# Patient Record
Sex: MALE | Race: WHITE | NOT HISPANIC OR LATINO | Employment: PART TIME | URBAN - METROPOLITAN AREA
[De-identification: names, ages, dates, MRNs, and addresses within clinical notes are randomized per-mention and may not be internally consistent; named-entity substitution may affect disease eponyms.]

---

## 2023-09-09 PROBLEM — E55.9 VITAMIN D DEFICIENCY: Status: ACTIVE | Noted: 2023-09-09

## 2023-09-09 PROBLEM — H02.831 DERMATOCHALASIS OF RIGHT UPPER EYELID: Status: ACTIVE | Noted: 2023-09-09

## 2023-09-09 PROBLEM — J30.2 SEASONAL ALLERGIES: Status: ACTIVE | Noted: 2023-09-09

## 2023-09-09 PROBLEM — F33.8 SEASONAL AFFECTIVE DISORDER (CMS-HCC): Status: ACTIVE | Noted: 2023-09-09

## 2023-09-09 PROBLEM — E66.01 MORBID OBESITY (MULTI): Status: ACTIVE | Noted: 2023-09-09

## 2023-09-09 PROBLEM — K21.00 GASTROESOPHAGEAL REFLUX DISEASE WITH ESOPHAGITIS WITHOUT HEMORRHAGE: Status: ACTIVE | Noted: 2023-09-09

## 2023-09-09 PROBLEM — E78.5 HYPERLIPIDEMIA: Status: ACTIVE | Noted: 2023-09-09

## 2023-09-09 PROBLEM — E11.9 ABNORMAL METABOLIC STATE DUE TO DIABETES MELLITUS (MULTI): Status: ACTIVE | Noted: 2023-09-09

## 2023-09-09 PROBLEM — H35.40 PERIPHERAL RETINAL DEGENERATION: Status: ACTIVE | Noted: 2023-09-09

## 2023-09-09 PROBLEM — H25.10 NUCLEAR SENILE CATARACT: Status: ACTIVE | Noted: 2023-09-09

## 2023-09-09 PROBLEM — I10 ESSENTIAL (PRIMARY) HYPERTENSION: Status: ACTIVE | Noted: 2023-09-09

## 2023-09-09 PROBLEM — H52.7 UNSPECIFIED DISORDER OF REFRACTION: Status: ACTIVE | Noted: 2023-09-09

## 2023-09-09 PROBLEM — G47.33 OBSTRUCTIVE SLEEP APNEA SYNDROME: Status: ACTIVE | Noted: 2023-09-09

## 2023-09-09 PROBLEM — H02.834 DERMATOCHALASIS OF LEFT UPPER EYELID: Status: ACTIVE | Noted: 2023-09-09

## 2023-09-09 PROBLEM — E11.65 TYPE 2 DIABETES MELLITUS WITH HYPERGLYCEMIA (MULTI): Status: ACTIVE | Noted: 2023-09-09

## 2023-09-09 PROBLEM — H35.419 RETINAL LATTICE DEGENERATION: Status: ACTIVE | Noted: 2023-09-09

## 2023-09-09 RX ORDER — INSULIN HUMAN 100 [IU]/ML
INJECTION, SUSPENSION SUBCUTANEOUS
COMMUNITY

## 2023-09-09 RX ORDER — OMEPRAZOLE 40 MG/1
CAPSULE, DELAYED RELEASE ORAL
COMMUNITY
End: 2023-10-10

## 2023-09-09 RX ORDER — OXYCODONE AND ACETAMINOPHEN 5; 325 MG/1; MG/1
TABLET ORAL
COMMUNITY

## 2023-09-09 RX ORDER — ASPIRIN 325 MG
TABLET, DELAYED RELEASE (ENTERIC COATED) ORAL
COMMUNITY

## 2023-09-09 RX ORDER — AZITHROMYCIN 250 MG/1
TABLET, FILM COATED ORAL
COMMUNITY
Start: 2022-12-08

## 2023-09-09 RX ORDER — ATENOLOL 50 MG/1
TABLET ORAL
COMMUNITY

## 2023-09-09 RX ORDER — ISOSORBIDE MONONITRATE 30 MG/1
TABLET, EXTENDED RELEASE ORAL
COMMUNITY

## 2023-09-09 RX ORDER — FLUTICASONE PROPIONATE 50 MCG
SPRAY, SUSPENSION (ML) NASAL
COMMUNITY

## 2023-09-09 RX ORDER — DULAGLUTIDE 1.5 MG/.5ML
INJECTION, SOLUTION SUBCUTANEOUS
COMMUNITY

## 2023-09-09 RX ORDER — ATORVASTATIN CALCIUM 40 MG/1
TABLET, FILM COATED ORAL
COMMUNITY
End: 2023-10-16

## 2023-09-09 RX ORDER — DULAGLUTIDE 0.75 MG/.5ML
INJECTION, SOLUTION SUBCUTANEOUS
COMMUNITY

## 2023-09-09 RX ORDER — LORATADINE 10 MG/1
TABLET ORAL
COMMUNITY

## 2023-09-09 RX ORDER — LISINOPRIL 10 MG/1
TABLET ORAL
COMMUNITY

## 2023-09-11 ENCOUNTER — HOSPITAL ENCOUNTER (OUTPATIENT)
Dept: DATA CONVERSION | Facility: HOSPITAL | Age: 63
Discharge: HOME | End: 2023-09-11
Payer: MEDICARE

## 2023-09-11 DIAGNOSIS — N20.0 CALCULUS OF KIDNEY: ICD-10-CM

## 2023-10-14 DIAGNOSIS — E78.5 HYPERLIPIDEMIA, UNSPECIFIED HYPERLIPIDEMIA TYPE: ICD-10-CM

## 2023-10-16 RX ORDER — ATORVASTATIN CALCIUM 40 MG/1
40 TABLET, FILM COATED ORAL DAILY
Qty: 90 TABLET | Refills: 1 | Status: SHIPPED | OUTPATIENT
Start: 2023-10-16 | End: 2024-03-27

## 2023-12-29 DIAGNOSIS — K21.00 GASTROESOPHAGEAL REFLUX DISEASE WITH ESOPHAGITIS WITHOUT HEMORRHAGE: ICD-10-CM

## 2023-12-29 RX ORDER — OMEPRAZOLE 40 MG/1
CAPSULE, DELAYED RELEASE ORAL
Qty: 42 CAPSULE | Refills: 1 | OUTPATIENT
Start: 2023-12-29

## 2024-03-25 ENCOUNTER — OFFICE VISIT (OUTPATIENT)
Dept: OPHTHALMOLOGY | Facility: CLINIC | Age: 64
End: 2024-03-25
Payer: MEDICARE

## 2024-03-25 ENCOUNTER — CLINICAL SUPPORT (OUTPATIENT)
Dept: OPHTHALMOLOGY | Facility: CLINIC | Age: 64
End: 2024-03-25
Payer: MEDICARE

## 2024-03-25 ENCOUNTER — APPOINTMENT (OUTPATIENT)
Dept: OPHTHALMOLOGY | Facility: CLINIC | Age: 64
End: 2024-03-25
Payer: MEDICARE

## 2024-03-25 DIAGNOSIS — H35.40 PERIPHERAL RETINAL DEGENERATION: ICD-10-CM

## 2024-03-25 DIAGNOSIS — E11.65 TYPE 2 DIABETES MELLITUS WITH HYPERGLYCEMIA, UNSPECIFIED WHETHER LONG TERM INSULIN USE (MULTI): Primary | ICD-10-CM

## 2024-03-25 DIAGNOSIS — H52.7 UNSPECIFIED DISORDER OF REFRACTION: ICD-10-CM

## 2024-03-25 DIAGNOSIS — H02.834 DERMATOCHALASIS OF BOTH UPPER EYELIDS: ICD-10-CM

## 2024-03-25 DIAGNOSIS — H25.813 COMBINED FORMS OF AGE-RELATED CATARACT OF BOTH EYES: ICD-10-CM

## 2024-03-25 DIAGNOSIS — H02.831 DERMATOCHALASIS OF BOTH UPPER EYELIDS: ICD-10-CM

## 2024-03-25 PROCEDURE — 92015 DETERMINE REFRACTIVE STATE: CPT | Performed by: OPHTHALMOLOGY

## 2024-03-25 PROCEDURE — 99214 OFFICE O/P EST MOD 30 MIN: CPT | Performed by: OPHTHALMOLOGY

## 2024-03-25 RX ORDER — BLOOD SUGAR DIAGNOSTIC
STRIP MISCELLANEOUS
COMMUNITY
Start: 2023-09-19

## 2024-03-25 RX ORDER — INSULIN HUMAN 100 [IU]/ML
INJECTION, SOLUTION PARENTERAL
COMMUNITY
Start: 2023-05-18

## 2024-03-25 RX ORDER — TAMSULOSIN HYDROCHLORIDE 0.4 MG/1
0.4 CAPSULE ORAL NIGHTLY
COMMUNITY

## 2024-03-25 RX ORDER — BLOOD-GLUCOSE METER
EACH MISCELLANEOUS
COMMUNITY
Start: 2023-09-19

## 2024-03-25 RX ORDER — DIAZEPAM 10 MG/1
TABLET ORAL
COMMUNITY
Start: 2024-02-02

## 2024-03-25 RX ORDER — SYRING-NEEDL,DISP,INSUL,0.3 ML 31GX15/64"
SYRINGE, EMPTY DISPOSABLE MISCELLANEOUS
COMMUNITY
Start: 2023-05-07

## 2024-03-25 RX ORDER — ERGOCALCIFEROL 1.25 MG/1
CAPSULE ORAL
COMMUNITY

## 2024-03-25 ASSESSMENT — REFRACTION_MANIFEST
METHOD_AUTOREFRACTION: 1
OD_AXIS: 095
OS_CYLINDER: -1.50
OS_SPHERE: +0.25
OS_AXIS: 080
OD_CYLINDER: -0.75
OD_SPHERE: -0.25

## 2024-03-25 ASSESSMENT — ENCOUNTER SYMPTOMS
EYES NEGATIVE: 0
PSYCHIATRIC NEGATIVE: 0
ENDOCRINE NEGATIVE: 0
HEMATOLOGIC/LYMPHATIC NEGATIVE: 0
RESPIRATORY NEGATIVE: 0
OCCASIONAL FEELINGS OF UNSTEADINESS: 0
CONSTITUTIONAL NEGATIVE: 0
GASTROINTESTINAL NEGATIVE: 0
ALLERGIC/IMMUNOLOGIC NEGATIVE: 0
CARDIOVASCULAR NEGATIVE: 0
LOSS OF SENSATION IN FEET: 0
NEUROLOGICAL NEGATIVE: 0
DEPRESSION: 0
MUSCULOSKELETAL NEGATIVE: 0

## 2024-03-25 ASSESSMENT — KERATOMETRY
OS_AXISANGLE2_DEGREES: 180
OS_K1POWER_DIOPTERS: 43.00
METHOD_AUTO_MANUAL: AUTOMATED
OS_AXISANGLE_DEGREES: 90
OD_K1POWER_DIOPTERS: 42.25
OD_AXISANGLE_DEGREES: 110
OD_AXISANGLE2_DEGREES: 20
OS_K2POWER_DIOPTERS: 42.25
OD_K2POWER_DIOPTERS: 42.50

## 2024-03-25 ASSESSMENT — CUP TO DISC RATIO
OS_RATIO: 0.4
OD_RATIO: 0.4

## 2024-03-25 ASSESSMENT — SLIT LAMP EXAM - LIDS
COMMENTS: 1+ BLEPHARITIS, 1+ DERMATOCHALASIS - UPPER LID
COMMENTS: 1+ BLEPHARITIS, 1+ DERMATOCHALASIS - UPPER LID

## 2024-03-25 ASSESSMENT — VISUAL ACUITY
OD_SC: 20/30
METHOD: SNELLEN - SINGLE
OS_SC: 20/30
OS_SC+: +1
OD_SC+: +1

## 2024-03-25 ASSESSMENT — PAIN SCALES - GENERAL: PAINLEVEL: 0-NO PAIN

## 2024-03-25 ASSESSMENT — PATIENT HEALTH QUESTIONNAIRE - PHQ9
1. LITTLE INTEREST OR PLEASURE IN DOING THINGS: NOT AT ALL
SUM OF ALL RESPONSES TO PHQ9 QUESTIONS 1 AND 2: 0
2. FEELING DOWN, DEPRESSED OR HOPELESS: NOT AT ALL

## 2024-03-25 ASSESSMENT — TONOMETRY
OD_IOP_MMHG: 16
OS_IOP_MMHG: 16
IOP_METHOD: GOLDMANN APPLANATION

## 2024-03-25 ASSESSMENT — EXTERNAL EXAM - RIGHT EYE: OD_EXAM: BROW PTOSIS

## 2024-03-25 ASSESSMENT — EXTERNAL EXAM - LEFT EYE: OS_EXAM: BROW PTOSIS

## 2024-03-25 NOTE — PROGRESS NOTES
"Subjective   Patient ID: Wyatt Castaneda is a 63 y.o. male.    Chief Complaint    Annual Exam       HPI    No visual acuity (VA) complaints    Last edited by JENI Donnelly on 3/25/2024  3:05 PM.        No current outpatient medications on file. (Ophthalmology pharm classes)       Current Outpatient Medications (Other)   Medication Sig Dispense Refill    Accu-Chek Guide Me Glucose Mtr misc USE AS DIRECTED E11.65      Accu-Chek Guide test strips strip Use as directed 3 times daily E11.65      aspirin 81 mg capsule 1 tablet Orally Once a day      atenolol (Tenormin) 50 mg tablet       atorvastatin (Lipitor) 40 mg tablet TAKE 1 TABLET BY MOUTH EVERY DAY 90 tablet 1    BD Veo Insulin Syringe UF 1/2 mL 31 gauge x 15/64\" syringe USE 3 TIMES A DAY AS DIRECTED      cholecalciferol (Vitamin D-3) 1,250 mcg (50,000 unit) capsule       fluticasone (Flonase) 50 mcg/actuation nasal spray SPRAY 1 SPRAY INTO EACH NOSTRIL EVERY DAY*INS COVERS 30 DAYS* for 30      HumuLIN R Regular U-100 Insuln 100 unit/mL injection INJECT 30 BEFORE MEALS PLUS SLIDING SCALE MAX DAILY 120 UNITS      insulin NPH, Isophane, (HumuLIN N NPH U-100 Insulin) 100 unit/mL injection 20 units breakfast 20 units lunch 25 units dinner Subcutaneous three times per day for 30 day(s)      isosorbide mononitrate ER (Imdur) 30 mg 24 hr tablet 1 tablet in the morning Orally Once a day for 30 day(s)      lisinopril 10 mg tablet 1 tablet Orally Once a day for 90 days      omeprazole (PriLOSEC) 40 mg DR capsule 1 CAPSULE 30 MINUTES BEFORE MORNING MEAL ORALLY DAILY 42 DAYS 42 capsule 1    tamsulosin (Flomax) 0.4 mg 24 hr capsule Take 1 capsule (0.4 mg) by mouth once daily at bedtime.      azithromycin (Zithromax) 250 mg tablet take two tabs today one for the next 4 days Orally Daily for 5 days      diazePAM (Valium) 10 mg tablet TAKE 1 TABLET BY MOUTH ONE TIME ONLY FOR 1 DOSE. TAKE 1 HOUR PRIOR TO MRI      dulaglutide (Trulicity) 0.75 mg/0.5 mL pen injector as directed " Subcutaneous once weekly for 90 days      dulaglutide (Trulicity) 1.5 mg/0.5 mL pen injector injection       ergocalciferol (Vitamin D-2) 1.25 MG (91057 UT) capsule TAKE 1 CAP WEEKLY 30 Oral for 30      loratadine (Claritin) 10 mg tablet       oxyCODONE-acetaminophen (Percocet) 5-325 mg tablet          Objective   Base Eye Exam       Visual Acuity (Snellen - Single)         Right Left    Dist sc 20/30 +1 20/30 +1              Tonometry (Goldmann Applanation, 3:19 PM)         Right Left    Pressure 16 16              Pupils         Dark Shape React APD    Right 4 Round 2 None    Left 4 Round 2 None              Extraocular Movement         Right Left     Full Full              Dilation       Both eyes: 1% Tropic 2.5% Phen @ 3:19 PM                  Additional Tests       Keratometry (Automated)         K1 Axis K2 Axis    Right 42.25 20 42.50 110    Left 43.00 180 42.25 90                  Slit Lamp and Fundus Exam       External Exam         Right Left    External Brow ptosis Brow ptosis              Slit Lamp Exam         Right Left    Lids/Lashes 1+ Blepharitis, 1+ Dermatochalasis - upper lid 1+ Blepharitis, 1+ Dermatochalasis - upper lid    Conjunctiva/Sclera normal bulbar and palepbral conjunctiva normal bulbar and palepbral conjunctiva    Cornea normal epi/stroma/endo and tear film normal epi/stroma/endo and tear film    Anterior Chamber normal anterior chamber, deep and quiet normal anterior chamber, deep and quiet    Iris pupil miotic; flomax pupil miotic; flomax    Lens 1+ Nuclear sclerosis, 1+ Cortical cataract 1+ Nuclear sclerosis, 1+ Cortical cataract    Anterior Vitreous vitreous syneresis vitreous syneresis              Fundus Exam         Right Left    Disc normal optic nerve normal optic nerve; tortuous vessels    C/D Ratio 0.4 0.4    Macula No background diabetic retinopathy No background diabetic retinopathy    Vessels normal vessels normal vessels    Periphery peripheral retinal degeneration  peripheral retinal degeneration                  Refraction       Manifest Refraction (Auto)         Sphere Cylinder Axis    Right -0.25 -0.75 095    Left +0.25 -1.50 080              Final Rx         Sphere Cylinder [place] Dist VA Add Near VA    Right -0.25 -0.75 090 20/20 +2.75 20/20    Left -0.00 -1.00 080 20/20 +2.75 20/20      Type: progressive    Expiration Date: 3/25/2026    Pupillary Distance: 66                    Assessment/Plan   Problem List Items Addressed This Visit          Eye/Vision problems    Dermatochalasis of both upper eyelids    Combined forms of age-related cataract of both eyes    Peripheral retinal degeneration    Type 2 diabetes mellitus with hyperglycemia (CMS/HCC) - Primary     F/u one year full.         Unspecified disorder of refraction

## 2024-03-25 NOTE — LETTER
March 25, 2024    Frances Smith DO  [address] [facility] [address]     Patient: Wyatt Castaneda   YOB: 1960   Date of Visit: 3/25/2024       Dear Dr. Frances Smith DO:    Thank you for referring Wyatt Castaneda to me for evaluation. Here is my assessment and plan of care:    Assessment/Plan:  Wyatt was seen today for annual exam.  Diagnoses and all orders for this visit:  Type 2 diabetes mellitus with hyperglycemia, unspecified whether long term insulin use (CMS/[facility]) (Primary)  Combined forms of age-related cataract of both eyes  Peripheral retinal degeneration  Dermatochalasis of both upper eyelids  Unspecified disorder of refraction    Below you will find my full exam findings. If you have questions, please do not hesitate to call me. I look forward to following Wyatt along with you.     No signs of background diabetic retinopathy (BDR) OU.  Follow up in one year.      Sincerely,        Arvind Perry MD        CC:   No Recipients        Base Eye Exam       Visual Acuity (Snellen - Single)         Right Left    Dist sc 20/30 +1 20/30 +1              Tonometry (Goldmann Applanation, 3:19 PM)         Right Left    Pressure 16 16              Pupils         Dark Shape React APD    Right 4 Round 2 None    Left 4 Round 2 None              Extraocular Movement         Right Left     Full Full              Dilation       Both eyes: 1% Tropic 2.5% Phen @ 3:19 PM                  Additional Tests       Keratometry (Automated)         K1 Axis K2 Axis    Right 42.25 20 42.50 110    Left 43.00 180 42.25 90                  Slit Lamp and Fundus Exam       External Exam         Right Left    External Brow ptosis Brow ptosis              Slit Lamp Exam         Right Left    Lids/Lashes 1+ Blepharitis, 1+ Dermatochalasis - upper lid 1+ Blepharitis, 1+ Dermatochalasis - upper lid    Conjunctiva/Sclera normal bulbar and palepbral conjunctiva normal bulbar and palepbral conjunctiva    Cornea normal  epi/stroma/endo and tear film normal epi/stroma/endo and tear film    Anterior Chamber normal anterior chamber, deep and quiet normal anterior chamber, deep and quiet    Iris pupil miotic; flomax pupil miotic; flomax    Lens 1+ Nuclear sclerosis, 1+ Cortical cataract 1+ Nuclear sclerosis, 1+ Cortical cataract    Anterior Vitreous vitreous syneresis vitreous syneresis              Fundus Exam         Right Left    Disc normal optic nerve normal optic nerve; tortuous vessels    C/D Ratio 0.4 0.4    Macula No background diabetic retinopathy No background diabetic retinopathy    Vessels normal vessels normal vessels    Periphery peripheral retinal degeneration peripheral retinal degeneration                  Refraction       Manifest Refraction (Auto)         Sphere Cylinder Axis    Right -0.25 -0.75 095    Left +0.25 -1.50 080              Final Rx         Sphere Cylinder [place] Dist VA Add Near VA    Right -0.25 -0.75 090 20/20 +2.75 20/20    Left -0.00 -1.00 080 20/20 +2.75 20/20      Type: progressive    Expiration Date: 3/25/2026    Pupillary Distance: 66

## 2024-03-27 DIAGNOSIS — E78.5 HYPERLIPIDEMIA, UNSPECIFIED HYPERLIPIDEMIA TYPE: ICD-10-CM

## 2024-03-27 RX ORDER — ATORVASTATIN CALCIUM 40 MG/1
40 TABLET, FILM COATED ORAL DAILY
Qty: 90 TABLET | Refills: 1 | Status: SHIPPED | OUTPATIENT
Start: 2024-03-27

## 2024-11-02 ENCOUNTER — HOSPITAL ENCOUNTER (EMERGENCY)
Facility: HOSPITAL | Age: 64
Discharge: HOME | End: 2024-11-02
Attending: EMERGENCY MEDICINE
Payer: MEDICARE

## 2024-11-02 VITALS
DIASTOLIC BLOOD PRESSURE: 94 MMHG | TEMPERATURE: 97.7 F | RESPIRATION RATE: 17 BRPM | WEIGHT: 230 LBS | HEART RATE: 54 BPM | BODY MASS INDEX: 33 KG/M2 | OXYGEN SATURATION: 98 % | SYSTOLIC BLOOD PRESSURE: 162 MMHG

## 2024-11-02 DIAGNOSIS — N17.9 ACUTE KIDNEY INJURY (CMS-HCC): ICD-10-CM

## 2024-11-02 DIAGNOSIS — R73.9 HYPERGLYCEMIA: Primary | ICD-10-CM

## 2024-11-02 LAB
ALBUMIN SERPL BCP-MCNC: 4 G/DL (ref 3.4–5)
ALP SERPL-CCNC: 78 U/L (ref 33–136)
ALT SERPL W P-5'-P-CCNC: 20 U/L (ref 10–52)
ANION GAP BLDV CALCULATED.4IONS-SCNC: 12 MMOL/L (ref 10–25)
ANION GAP SERPL CALC-SCNC: 10 MMOL/L (ref 10–20)
ANION GAP SERPL CALC-SCNC: 12 MMOL/L (ref 10–20)
APPEARANCE UR: CLEAR
AST SERPL W P-5'-P-CCNC: 25 U/L (ref 9–39)
BASE EXCESS BLDV CALC-SCNC: -0.8 MMOL/L (ref -2–3)
BASOPHILS # BLD AUTO: 0.05 X10*3/UL (ref 0–0.1)
BASOPHILS NFR BLD AUTO: 0.9 %
BILIRUB SERPL-MCNC: 0.4 MG/DL (ref 0–1.2)
BILIRUB UR STRIP.AUTO-MCNC: NEGATIVE MG/DL
BODY TEMPERATURE: 37 DEGREES CELSIUS
BUN SERPL-MCNC: 36 MG/DL (ref 6–23)
BUN SERPL-MCNC: 39 MG/DL (ref 6–23)
CA-I BLDV-SCNC: 1.19 MMOL/L (ref 1.1–1.33)
CALCIUM SERPL-MCNC: 8.4 MG/DL (ref 8.6–10.3)
CALCIUM SERPL-MCNC: 9 MG/DL (ref 8.6–10.3)
CHLORIDE BLDV-SCNC: 100 MMOL/L (ref 98–107)
CHLORIDE SERPL-SCNC: 101 MMOL/L (ref 98–107)
CHLORIDE SERPL-SCNC: 106 MMOL/L (ref 98–107)
CO2 SERPL-SCNC: 25 MMOL/L (ref 21–32)
CO2 SERPL-SCNC: 26 MMOL/L (ref 21–32)
COLOR UR: COLORLESS
CREAT SERPL-MCNC: 1.25 MG/DL (ref 0.5–1.3)
CREAT SERPL-MCNC: 1.54 MG/DL (ref 0.5–1.3)
EGFRCR SERPLBLD CKD-EPI 2021: 49 ML/MIN/1.73M*2
EGFRCR SERPLBLD CKD-EPI 2021: 64 ML/MIN/1.73M*2
EOSINOPHIL # BLD AUTO: 0.09 X10*3/UL (ref 0–0.7)
EOSINOPHIL NFR BLD AUTO: 1.6 %
ERYTHROCYTE [DISTWIDTH] IN BLOOD BY AUTOMATED COUNT: 14.1 % (ref 11.5–14.5)
GLUCOSE BLD MANUAL STRIP-MCNC: 281 MG/DL (ref 74–99)
GLUCOSE BLD MANUAL STRIP-MCNC: 431 MG/DL (ref 74–99)
GLUCOSE BLDV-MCNC: 395 MG/DL (ref 74–99)
GLUCOSE SERPL-MCNC: 274 MG/DL (ref 74–99)
GLUCOSE SERPL-MCNC: 395 MG/DL (ref 74–99)
GLUCOSE UR STRIP.AUTO-MCNC: ABNORMAL MG/DL
HCO3 BLDV-SCNC: 24.3 MMOL/L (ref 22–26)
HCT VFR BLD AUTO: 44.2 % (ref 41–52)
HCT VFR BLD EST: 41 % (ref 41–52)
HGB BLD-MCNC: 14.2 G/DL (ref 13.5–17.5)
HGB BLDV-MCNC: 13.6 G/DL (ref 13.5–17.5)
IMM GRANULOCYTES # BLD AUTO: 0.01 X10*3/UL (ref 0–0.7)
IMM GRANULOCYTES NFR BLD AUTO: 0.2 % (ref 0–0.9)
INHALED O2 CONCENTRATION: 21 %
KETONES UR STRIP.AUTO-MCNC: NEGATIVE MG/DL
LACTATE BLDV-SCNC: 1.5 MMOL/L (ref 0.4–2)
LEUKOCYTE ESTERASE UR QL STRIP.AUTO: NEGATIVE
LYMPHOCYTES # BLD AUTO: 1.71 X10*3/UL (ref 1.2–4.8)
LYMPHOCYTES NFR BLD AUTO: 29.7 %
MAGNESIUM SERPL-MCNC: 1.64 MG/DL (ref 1.6–2.4)
MCH RBC QN AUTO: 26.3 PG (ref 26–34)
MCHC RBC AUTO-ENTMCNC: 32.1 G/DL (ref 32–36)
MCV RBC AUTO: 82 FL (ref 80–100)
MONOCYTES # BLD AUTO: 0.44 X10*3/UL (ref 0.1–1)
MONOCYTES NFR BLD AUTO: 7.7 %
NEUTROPHILS # BLD AUTO: 3.45 X10*3/UL (ref 1.2–7.7)
NEUTROPHILS NFR BLD AUTO: 59.9 %
NITRITE UR QL STRIP.AUTO: NEGATIVE
NRBC BLD-RTO: 0 /100 WBCS (ref 0–0)
OXYHGB MFR BLDV: 81.4 % (ref 45–75)
PCO2 BLDV: 41 MM HG (ref 41–51)
PH BLDV: 7.38 PH (ref 7.33–7.43)
PH UR STRIP.AUTO: 5.5 [PH]
PHOSPHATE SERPL-MCNC: 4.3 MG/DL (ref 2.5–4.9)
PLATELET # BLD AUTO: 150 X10*3/UL (ref 150–450)
PO2 BLDV: 50 MM HG (ref 35–45)
POTASSIUM BLDV-SCNC: 4.2 MMOL/L (ref 3.5–5.3)
POTASSIUM SERPL-SCNC: 4 MMOL/L (ref 3.5–5.3)
POTASSIUM SERPL-SCNC: 4.9 MMOL/L (ref 3.5–5.3)
PROT SERPL-MCNC: 7 G/DL (ref 6.4–8.2)
PROT UR STRIP.AUTO-MCNC: ABNORMAL MG/DL
RBC # BLD AUTO: 5.39 X10*6/UL (ref 4.5–5.9)
RBC # UR STRIP.AUTO: NEGATIVE /UL
RBC #/AREA URNS AUTO: NORMAL /HPF
SAO2 % BLDV: 83 % (ref 45–75)
SODIUM BLDV-SCNC: 132 MMOL/L (ref 136–145)
SODIUM SERPL-SCNC: 134 MMOL/L (ref 136–145)
SODIUM SERPL-SCNC: 137 MMOL/L (ref 136–145)
SP GR UR STRIP.AUTO: 1.02
UROBILINOGEN UR STRIP.AUTO-MCNC: NORMAL MG/DL
WBC # BLD AUTO: 5.8 X10*3/UL (ref 4.4–11.3)
WBC #/AREA URNS AUTO: NORMAL /HPF

## 2024-11-02 PROCEDURE — 82947 ASSAY GLUCOSE BLOOD QUANT: CPT

## 2024-11-02 PROCEDURE — 2500000004 HC RX 250 GENERAL PHARMACY W/ HCPCS (ALT 636 FOR OP/ED): Performed by: EMERGENCY MEDICINE

## 2024-11-02 PROCEDURE — 36415 COLL VENOUS BLD VENIPUNCTURE: CPT | Performed by: EMERGENCY MEDICINE

## 2024-11-02 PROCEDURE — 81003 URINALYSIS AUTO W/O SCOPE: CPT | Performed by: EMERGENCY MEDICINE

## 2024-11-02 PROCEDURE — 2500000002 HC RX 250 W HCPCS SELF ADMINISTERED DRUGS (ALT 637 FOR MEDICARE OP, ALT 636 FOR OP/ED): Performed by: EMERGENCY MEDICINE

## 2024-11-02 PROCEDURE — 96360 HYDRATION IV INFUSION INIT: CPT

## 2024-11-02 PROCEDURE — 83735 ASSAY OF MAGNESIUM: CPT | Performed by: EMERGENCY MEDICINE

## 2024-11-02 PROCEDURE — 85025 COMPLETE CBC W/AUTO DIFF WBC: CPT | Performed by: EMERGENCY MEDICINE

## 2024-11-02 PROCEDURE — 84132 ASSAY OF SERUM POTASSIUM: CPT | Performed by: EMERGENCY MEDICINE

## 2024-11-02 PROCEDURE — 84132 ASSAY OF SERUM POTASSIUM: CPT | Mod: 59 | Performed by: EMERGENCY MEDICINE

## 2024-11-02 PROCEDURE — 99283 EMERGENCY DEPT VISIT LOW MDM: CPT

## 2024-11-02 PROCEDURE — 84100 ASSAY OF PHOSPHORUS: CPT | Performed by: EMERGENCY MEDICINE

## 2024-11-02 RX ORDER — INSULIN LISPRO 100 [IU]/ML
5 INJECTION, SOLUTION INTRAVENOUS; SUBCUTANEOUS ONCE
Status: COMPLETED | OUTPATIENT
Start: 2024-11-02 | End: 2024-11-02

## 2024-11-02 RX ADMIN — SODIUM CHLORIDE 1000 ML: 9 INJECTION, SOLUTION INTRAVENOUS at 17:11

## 2024-11-02 RX ADMIN — INSULIN LISPRO 5 UNITS: 100 INJECTION, SOLUTION INTRAVENOUS; SUBCUTANEOUS at 17:09

## 2024-11-02 ASSESSMENT — COLUMBIA-SUICIDE SEVERITY RATING SCALE - C-SSRS
1. IN THE PAST MONTH, HAVE YOU WISHED YOU WERE DEAD OR WISHED YOU COULD GO TO SLEEP AND NOT WAKE UP?: NO
2. HAVE YOU ACTUALLY HAD ANY THOUGHTS OF KILLING YOURSELF?: NO
6. HAVE YOU EVER DONE ANYTHING, STARTED TO DO ANYTHING, OR PREPARED TO DO ANYTHING TO END YOUR LIFE?: NO

## 2024-11-02 ASSESSMENT — PAIN SCALES - GENERAL
PAINLEVEL_OUTOF10: 0 - NO PAIN
PAINLEVEL_OUTOF10: 0 - NO PAIN

## 2024-11-02 ASSESSMENT — PAIN - FUNCTIONAL ASSESSMENT: PAIN_FUNCTIONAL_ASSESSMENT: 0-10

## 2024-11-02 NOTE — Clinical Note
Wyatt Castaneda was seen and treated in our emergency department on 11/2/2024.  He may return to work on 11/03/2024.       If you have any questions or concerns, please don't hesitate to call.      Damaso Marley MD

## 2024-11-02 NOTE — DISCHARGE INSTRUCTIONS
You were seen emergency for evaluation elevated blood sugar.  Your blood sugar came down with insulin and fluids.  Please continue your medications as prescribed, recommend going back to the original dosing as prescribed.  Please follow-up with your endocrinologist regarding this emergency room visit.  Please return if you have worsening symptoms include but not limited to significant fatigue, weakness, fever, elevated blood sugars that cannot be controlled, or if you have other concerns.

## 2024-11-02 NOTE — ED PROVIDER NOTES
History/Exam limitations: none.   Additional history was obtained from patient and past medical records.          HPI:    Wyatt Castaneda is a 64 y.o. male PMH IDDM presenting for evaluation of hyperglycemia.  Patient states that he had a continuous glucose monitor recently.  He states that it was showing that his glucose was in the low 200s and his wife advised that he cuts his 70/30 insulin in half given the low 200s readings as she was concerned this may be too low.  He states that he cut in half over last couple days and now his glucoses been climbing up and reading high today.  States that he feels a little fatigued but no focal neurodeficits.  No vision changes.  No headache, neck sickness, chest pain, shortness breath, abdominal pain, diarrhea dysuria, fever, chills.          Physical Exam:  ED Triage Vitals [11/02/24 1522]   Temperature Heart Rate Respirations BP   36.5 °C (97.7 °F) 62 20 (!) 180/95      Pulse Ox Temp Source Heart Rate Source Patient Position   96 % Temporal Monitor --      BP Location FiO2 (%)     -- --        GEN:      Alert, NAD  Eyes:       PERRL, EOMI  HENT:      NC/AT, OP clear, airway patent, MM, no nuchal rigidity  CV:      RRR, no MRG, no LE pitting edema, 2+ radial pulses  PULM:     CTAB, no w/r/r, easy WOB, symmetric chest rise  ABD:      Soft, NT, ND, no masses, BS +  :       No CVA TTP  NEURO:   A/Ox4, CN II-XII intact, strength 5/5 in all 4 ext, SILT  MSK:      FROM, no joint deformities or swelling, no e/o trauma  SKIN:       Warm and dry  PSYCH:    Appropriate mood and affect         MDM/ED Course:   Wyatt Castaneda is a 64 y.o. male PMH IDDM presenting for evaluation of hyperglycemia.  Vitals remarkable for hypertension as above.  Exam as documented above.  Differential includes hyperglycemia secondary to underlying infectious process, underdosing insulin.  No evidence of underlying infectious process on exam or on history.  Patient does report he feels a little fatigued and  his glucose excitingly feels similar to that currently.  Differential includes DKA.  Patient does state in the last few days he began having his insulin dose as his partner reportedly told him that to 30 was worrisome for being too low.  Discussed goal glucose levels at length with patient and patient verbalized understanding to all discussed.  Labs obtained and VBG with pH 7.38, not consistent with DKA.  Chemistry with glucose 395, creatinine 1.54 from baseline most recently at 1.2.  CBC with no leukocytosis or significant anemia.  Urinalysis with glucose and 1+ protein.  Patient was given IV fluids and 5 units subcutaneous insulin lispro and glucose improved to 270s.  Patient felt baseline on reassessment with no symptoms.  His blood pressure improved to 150s over 80s.  His creatinine improved to 1.25 near his baseline after fluids suspect potentially prerenal ZENIA in the setting of increased urine output due to hyperglycemia.  Advised patient returns to his recommended/prescribed insulin regimen/dosing.  Patient verbalized understanding to all discussed.  Patient was explained findings, exam/study results, the importance of follow up and the plan moving forward; patient verbalized understanding and agreement. All questions were answered and no new questions at this time. Patient will be discharged with strict return precautions, follow up plan with PCP/endocrinology     ED Course as of 11/05/24 1231   Sat Nov 02, 2024   1814 Glucose reassuring at 281 [JM]      ED Course User Index  [JM] Damaso Marley MD         Diagnoses as of 11/05/24 1231   Hyperglycemia   Acute kidney injury (CMS-HCC)         Chronic medical conditions affecting care listed in MDM. I independently reviewed imaging studies and agreed with radiology reads. I reviewed recent and relevant outside records including PCP notes, Prior discharge summaries, and prior radiology reports.    Procedure  Procedures    Diagnosis:   1.  Hyperglycemia  2.   ZENIA, resolved    Dispo: Discharged in stable condition      Disclaimer: Portions of this note were dictated by speech recognition. An attempt at proof reading was made to minimize errors. Minor errors in transcription may be present.  Please call if questions.     Damaso Marley MD  11/05/24 2610

## 2025-03-31 ENCOUNTER — APPOINTMENT (OUTPATIENT)
Dept: OPHTHALMOLOGY | Facility: CLINIC | Age: 65
End: 2025-03-31
Payer: MEDICARE

## 2025-05-02 ENCOUNTER — APPOINTMENT (OUTPATIENT)
Dept: OPHTHALMOLOGY | Facility: CLINIC | Age: 65
End: 2025-05-02
Payer: MEDICARE

## 2025-06-06 DIAGNOSIS — N40.1 BENIGN PROSTATIC HYPERPLASIA WITH LOWER URINARY TRACT SYMPTOMS, SYMPTOM DETAILS UNSPECIFIED: ICD-10-CM

## 2025-06-06 RX ORDER — TAMSULOSIN HYDROCHLORIDE 0.4 MG/1
0.4 CAPSULE ORAL NIGHTLY
Qty: 30 CAPSULE | Refills: 1 | Status: SHIPPED | OUTPATIENT
Start: 2025-06-06 | End: 2025-08-05

## 2025-06-14 NOTE — PROGRESS NOTES
Patient is a 65 y.o. male presenting for followup with PMH of BPH, and kidney stones.    SUBJECTIVE:  HPI   He presents for followup.   He was seen in 2023.  His last PSA was 2.17 in July 2024.  He is taking tamsulosin for BPH.  He has history of kidney stones. He states he has infrequent small gravel and denies gross hematuria.    Medical History[1]  Surgical History[2]   Family History[3]  Social History[4]    Review of Systems   All systems have been reviewed and are negative unless otherwise noted in the HPI.    OBJECTIVE:  There were no vitals taken for this visit.  Physical Exam   Constitutional: No obvious distress.  Cardiovascular: Extremities are warm and well perfused.  Respiratory: No audible wheezing/stridor; respirations do not appear labored.  Neurologic: Alert and oriented x3.  Genitourinary: No CVA tenderness, bladder not palpable.   No scrotal masses or tenderness. No penile lesions.   BEVERLY: prostate is enlarged. No nodules on the tip of the prostate, non-tender.    LABS:  Lab Results   Component Value Date    WBC 5.8 11/02/2024    HGB 14.2 11/02/2024    HCT 44.2 11/02/2024    MCV 82 11/02/2024     11/02/2024    GLUCOSE 274 (H) 11/02/2024    CALCIUM 8.4 (L) 11/02/2024     11/02/2024    K 4.0 11/02/2024    CO2 25 11/02/2024     11/02/2024    BUN 36 (H) 11/02/2024    CREATININE 1.25 11/02/2024    EGFR 64 11/02/2024    PTH 46 04/08/2019    URICACID 6.2 04/08/2019    PSA 2.5 02/27/2023     IMAGING:  XR abdomen: 9/11/23  FINDINGS:  There are no abnormally dilated loops of large or small bowel.  Free air is not excluded on the basis of this study.  No definite calcifications can be seen overlying the bilateral renal  shadows, although these are somewhat obscured by overlying stool.  Osseous structures are notable for degenerative changes in the bilateral  hips.    PROCEDURES:  PVR: 37 mL  6/16/25    ASSESSMENT/PLAN:  Problem List Items Addressed This Visit       Prostate cancer  screening    Relevant Orders    PSA    Kidney stones    Relevant Orders    XR abdomen 1 view     Other Visit Diagnoses         Urinary symptom or sign        Relevant Orders    POCT UA Automated manually resulted (Completed)    Measure post void residual (Completed)           He has history of BPH, treated with tamsulosin. Tamsulosin renewed.     He has history of kidney stones, treated with ESWL in 2023. He will repeat KUB.    He is followed for prostate cancer screening. PSA ordered. BEVERLY negative.  PSA summary  07/10/2024 2.17  02/27/2023 2.5  08/17/2020 2.1  04/30/2019 1.6    RTC in 1 year.     All questions were answered to the patient’s satisfaction.  Patient agrees with the plan and wishes to proceed.  Follow-up will be scheduled appropriately.     Scribe Attestation  By signing my name below, I, Evangelina Lynnibkhurram,   attest that this documentation has been prepared under the direction and in the presence of Aron Ventura MD.         [1]   Past Medical History:  Diagnosis Date    Age-related nuclear cataract of both eyes     Dermatochalasis of both upper eyelids     Diabetes mellitus without complication     Lattice degeneration of retina, bilateral     Unspecified disorder of refraction     Unspecified peripheral retinal degeneration    [2] History reviewed. No pertinent surgical history.  [3]   Family History  Problem Relation Name Age of Onset    Cancer Mother      Heart disease Father      Diabetes Sister      Diabetes Brother     [4]   Social History  Tobacco Use    Smoking status: Never    Smokeless tobacco: Never   Vaping Use    Vaping status: Never Used   Substance Use Topics    Alcohol use: Not Currently    Drug use: Not Currently

## 2025-06-16 ENCOUNTER — HOSPITAL ENCOUNTER (OUTPATIENT)
Dept: RADIOLOGY | Facility: HOSPITAL | Age: 65
Discharge: HOME | End: 2025-06-16
Payer: MEDICARE

## 2025-06-16 ENCOUNTER — APPOINTMENT (OUTPATIENT)
Dept: UROLOGY | Facility: CLINIC | Age: 65
End: 2025-06-16
Payer: MEDICARE

## 2025-06-16 DIAGNOSIS — N20.0 KIDNEY STONES: ICD-10-CM

## 2025-06-16 DIAGNOSIS — Z12.5 PROSTATE CANCER SCREENING: ICD-10-CM

## 2025-06-16 DIAGNOSIS — N40.1 BENIGN PROSTATIC HYPERPLASIA WITH LOWER URINARY TRACT SYMPTOMS, SYMPTOM DETAILS UNSPECIFIED: ICD-10-CM

## 2025-06-16 DIAGNOSIS — R39.9 URINARY SYMPTOM OR SIGN: ICD-10-CM

## 2025-06-16 PROBLEM — R39.12 BENIGN PROSTATIC HYPERPLASIA WITH WEAK URINARY STREAM: Status: ACTIVE | Noted: 2025-06-16

## 2025-06-16 LAB
POC APPEARANCE, URINE: CLEAR
POC BILIRUBIN, URINE: NEGATIVE
POC BLOOD, URINE: NEGATIVE
POC COLOR, URINE: YELLOW
POC GLUCOSE, URINE: ABNORMAL MG/DL
POC KETONES, URINE: NEGATIVE MG/DL
POC LEUKOCYTES, URINE: NEGATIVE
POC NITRITE,URINE: NEGATIVE
POC PH, URINE: 5 PH
POC PROTEIN, URINE: ABNORMAL MG/DL
POC SPECIFIC GRAVITY, URINE: 1.02
POC UROBILINOGEN, URINE: 0.2 EU/DL
PSA SERPL-MCNC: 1.47 NG/ML

## 2025-06-16 PROCEDURE — 1160F RVW MEDS BY RX/DR IN RCRD: CPT | Performed by: UROLOGY

## 2025-06-16 PROCEDURE — 74018 RADEX ABDOMEN 1 VIEW: CPT

## 2025-06-16 PROCEDURE — 1126F AMNT PAIN NOTED NONE PRSNT: CPT | Performed by: UROLOGY

## 2025-06-16 PROCEDURE — 74018 RADEX ABDOMEN 1 VIEW: CPT | Performed by: RADIOLOGY

## 2025-06-16 PROCEDURE — 4010F ACE/ARB THERAPY RXD/TAKEN: CPT | Performed by: UROLOGY

## 2025-06-16 PROCEDURE — 81003 URINALYSIS AUTO W/O SCOPE: CPT | Performed by: UROLOGY

## 2025-06-16 PROCEDURE — 1036F TOBACCO NON-USER: CPT | Performed by: UROLOGY

## 2025-06-16 PROCEDURE — 99214 OFFICE O/P EST MOD 30 MIN: CPT | Performed by: UROLOGY

## 2025-06-16 PROCEDURE — 1159F MED LIST DOCD IN RCRD: CPT | Performed by: UROLOGY

## 2025-06-16 PROCEDURE — G2211 COMPLEX E/M VISIT ADD ON: HCPCS | Performed by: UROLOGY

## 2025-06-16 RX ORDER — TAMSULOSIN HYDROCHLORIDE 0.4 MG/1
0.4 CAPSULE ORAL NIGHTLY
Qty: 90 CAPSULE | Refills: 3 | Status: SHIPPED | OUTPATIENT
Start: 2025-06-16 | End: 2026-06-11

## 2025-06-16 ASSESSMENT — PAIN SCALES - GENERAL: PAINLEVEL_OUTOF10: 0-NO PAIN

## 2025-09-08 ENCOUNTER — APPOINTMENT (OUTPATIENT)
Dept: OPHTHALMOLOGY | Facility: CLINIC | Age: 65
End: 2025-09-08
Payer: MEDICARE

## 2026-06-22 ENCOUNTER — APPOINTMENT (OUTPATIENT)
Dept: UROLOGY | Facility: CLINIC | Age: 66
End: 2026-06-22
Payer: MEDICARE